# Patient Record
Sex: FEMALE | URBAN - METROPOLITAN AREA
[De-identification: names, ages, dates, MRNs, and addresses within clinical notes are randomized per-mention and may not be internally consistent; named-entity substitution may affect disease eponyms.]

---

## 2019-04-01 ENCOUNTER — APPOINTMENT (OUTPATIENT)
Age: 29
Setting detail: DERMATOLOGY
End: 2019-04-01

## 2019-04-01 DIAGNOSIS — L50.1 IDIOPATHIC URTICARIA: ICD-10-CM

## 2019-04-01 DIAGNOSIS — L29.8 OTHER PRURITUS: ICD-10-CM

## 2019-04-01 PROCEDURE — OTHER COUNSELING: OTHER

## 2019-04-01 PROCEDURE — OTHER PRESCRIPTION MEDICATION MANAGEMENT: OTHER

## 2019-04-01 PROCEDURE — 99202 OFFICE O/P NEW SF 15 MIN: CPT

## 2019-04-01 PROCEDURE — OTHER ADDITIONAL NOTES: OTHER

## 2019-04-01 PROCEDURE — OTHER PRESCRIPTION: OTHER

## 2019-04-01 PROCEDURE — OTHER MIPS QUALITY: OTHER

## 2019-04-01 RX ORDER — HYDROXYZINE HYDROCHLORIDE 25 MG/1
25 MG TABLET, FILM COATED ORAL QHS
Qty: 30 | Refills: 1 | Status: ERX | COMMUNITY
Start: 2019-04-01

## 2019-04-01 RX ORDER — TRIAMCINOLONE ACETONIDE 1 MG/G
0.1% CREAM TOPICAL BID
Qty: 1 | Refills: 0 | Status: ERX | COMMUNITY
Start: 2019-04-01

## 2019-04-01 RX ORDER — FEXOFENADINE 180 MG/1
180 MG TABLET, FILM COATED ORAL QD
Qty: 30 | Refills: 1 | Status: ERX | COMMUNITY
Start: 2019-04-01

## 2019-04-01 ASSESSMENT — LOCATION SIMPLE DESCRIPTION DERM
LOCATION SIMPLE: LEFT FOREARM
LOCATION SIMPLE: RIGHT HAND
LOCATION SIMPLE: RIGHT FOREARM
LOCATION SIMPLE: LEFT HAND

## 2019-04-01 ASSESSMENT — LOCATION DETAILED DESCRIPTION DERM
LOCATION DETAILED: LEFT PROXIMAL DORSAL FOREARM
LOCATION DETAILED: RIGHT RADIAL DORSAL HAND
LOCATION DETAILED: LEFT RADIAL DORSAL HAND
LOCATION DETAILED: RIGHT PROXIMAL DORSAL FOREARM
LOCATION DETAILED: RIGHT ULNAR DORSAL HAND

## 2019-04-01 ASSESSMENT — LOCATION ZONE DERM
LOCATION ZONE: HAND
LOCATION ZONE: ARM

## 2019-04-01 NOTE — PROCEDURE: ADDITIONAL NOTES
Additional Notes: Patient was instructed to stop the buspirone until rash clears.
Detail Level: Simple

## 2019-04-01 NOTE — HPI: RASH
Is This A New Presentation, Or A Follow-Up?: Rash
Additional History: Patient was given Clobetasol which she felt made the rash worse.  Patient went to emergency room approximately two weeks ago and was given a ten day course of oral steroids.

## 2019-04-01 NOTE — PROCEDURE: MIPS QUALITY
Quality 110: Preventive Care And Screening: Influenza Immunization: Influenza Immunization not Administered for Documented Reasons.
Quality 130: Documentation Of Current Medications In The Medical Record: Current Medications Documented
Quality 131: Pain Assessment And Follow-Up: Pain assessment using a standardized tool is documented as negative, no follow-up plan required
Quality 226: Preventive Care And Screening: Tobacco Use: Screening And Cessation Intervention: Patient screened for tobacco use, is a smoker AND received Cessation Counseling
Detail Level: Detailed

## 2019-04-29 ENCOUNTER — APPOINTMENT (OUTPATIENT)
Age: 29
Setting detail: DERMATOLOGY
End: 2019-04-29

## 2019-04-29 ENCOUNTER — RX ONLY (RX ONLY)
Age: 29
End: 2019-04-29

## 2019-04-29 DIAGNOSIS — L27.1 LOCALIZED SKIN ERUPTION DUE TO DRUGS AND MEDICAMENTS TAKEN INTERNALLY: ICD-10-CM

## 2019-04-29 DIAGNOSIS — L29.8 OTHER PRURITUS: ICD-10-CM

## 2019-04-29 PROBLEM — L30.9 DERMATITIS, UNSPECIFIED: Status: ACTIVE | Noted: 2019-04-29

## 2019-04-29 PROCEDURE — OTHER COUNSELING: OTHER

## 2019-04-29 PROCEDURE — OTHER MIPS QUALITY: OTHER

## 2019-04-29 PROCEDURE — 11104 PUNCH BX SKIN SINGLE LESION: CPT

## 2019-04-29 PROCEDURE — 99213 OFFICE O/P EST LOW 20 MIN: CPT | Mod: 25

## 2019-04-29 PROCEDURE — OTHER ADDITIONAL NOTES: OTHER

## 2019-04-29 PROCEDURE — OTHER PRESCRIPTION: OTHER

## 2019-04-29 PROCEDURE — OTHER BIOPSY BY PUNCH METHOD: OTHER

## 2019-04-29 RX ORDER — FEXOFENADINE 180 MG/1
180 MG TABLET, FILM COATED ORAL QD
Qty: 30 | Refills: 1 | Status: ERX

## 2019-04-29 RX ORDER — CLOBETASOL PROPIONATE 0.5 MG/G
1 CREAM TOPICAL BID
Qty: 1 | Refills: 3 | Status: ERX | COMMUNITY
Start: 2019-04-29

## 2019-04-29 RX ORDER — HYDROXYZINE HYDROCHLORIDE 25 MG/1
25 MG TABLET, FILM COATED ORAL QHS
Qty: 30 | Refills: 1 | Status: ERX

## 2019-04-29 ASSESSMENT — LOCATION SIMPLE DESCRIPTION DERM
LOCATION SIMPLE: RIGHT FOREARM
LOCATION SIMPLE: LEFT FOREARM
LOCATION SIMPLE: RIGHT HAND
LOCATION SIMPLE: LEFT HAND

## 2019-04-29 ASSESSMENT — LOCATION ZONE DERM
LOCATION ZONE: ARM
LOCATION ZONE: HAND

## 2019-04-29 ASSESSMENT — LOCATION DETAILED DESCRIPTION DERM
LOCATION DETAILED: RIGHT ULNAR DORSAL HAND
LOCATION DETAILED: RIGHT PROXIMAL DORSAL FOREARM
LOCATION DETAILED: LEFT PROXIMAL DORSAL FOREARM
LOCATION DETAILED: LEFT RADIAL DORSAL HAND

## 2019-04-29 NOTE — PROCEDURE: ADDITIONAL NOTES
Additional Notes: Discussed potential diagnoses, advised we would take punch biopsy sample at today’s visit to clarify. Advised what to expect during and after procedure. Patient voiced understanding and opted for procedure. Consent signed. Advised to discontinue triamcinolone, begin using clobetasol. We will refill hydroxyzine and fexofenadine for patient to take as prescribed.
Detail Level: Simple

## 2019-04-29 NOTE — PROCEDURE: BIOPSY BY PUNCH METHOD
Hemostasis: None
Additional Anesthesia Volume In Cc (Will Not Render If 0): 0
Notification Instructions: Patient will be notified of biopsy results. However, patient instructed to call the office if not contacted within 2 weeks.
Anesthesia Type: 1% lidocaine with 1:100,000 epinephrine
Render Post-Care Instructions In Note?: no
Was A Bandage Applied: Yes
Punch Size In Mm: 3
Post-Care Instructions: I reviewed with the patient in detail post-care instructions. Patient is to keep the biopsy site dry overnight, and then apply bacitracin twice daily until healed. Patient may apply hydrogen peroxide soaks to remove any crusting.
Anesthesia Volume In Cc (Will Not Render If 0): 0.5
Consent: Written consent was obtained and risks were reviewed including but not limited to scarring, infection, bleeding, scabbing, incomplete removal, nerve damage and allergy to anesthesia.
Dressing: bandage
Suture Removal: 14 days
Detail Level: Detailed
Billing Type: Third-Party Bill
Epidermal Sutures: 4-0 Nylon
Home Suture Removal Text: Patient was provided a home suture removal kit and will remove their sutures at home.  If they have any questions or difficulties they will call the office.
Wound Care: Bacitracin
Biopsy Type: H and E

## 2019-04-29 NOTE — PROCEDURE: MIPS QUALITY
Quality 110: Preventive Care And Screening: Influenza Immunization: Influenza Immunization not Administered for Documented Reasons.
Quality 130: Documentation Of Current Medications In The Medical Record: Current Medications Documented
Quality 131: Pain Assessment And Follow-Up: Pain assessment using a standardized tool is documented as negative, no follow-up plan required
Detail Level: Detailed
Quality 226: Preventive Care And Screening: Tobacco Use: Screening And Cessation Intervention: Patient screened for tobacco use, is a smoker AND received Cessation Counseling

## 2019-05-30 ENCOUNTER — APPOINTMENT (OUTPATIENT)
Age: 29
Setting detail: DERMATOLOGY
End: 2019-05-30

## 2019-05-30 DIAGNOSIS — L50.1 IDIOPATHIC URTICARIA: ICD-10-CM

## 2019-05-30 DIAGNOSIS — B35.2 TINEA MANUUM: ICD-10-CM

## 2019-05-30 DIAGNOSIS — L29.8 OTHER PRURITUS: ICD-10-CM

## 2019-05-30 PROCEDURE — 99213 OFFICE O/P EST LOW 20 MIN: CPT

## 2019-05-30 PROCEDURE — OTHER MIPS QUALITY: OTHER

## 2019-05-30 PROCEDURE — OTHER PRESCRIPTION MEDICATION MANAGEMENT: OTHER

## 2019-05-30 PROCEDURE — OTHER COUNSELING: OTHER

## 2019-05-30 PROCEDURE — OTHER PRESCRIPTION: OTHER

## 2019-05-30 RX ORDER — FLUCONAZOLE 150 MG/1
150 MG TABLET ORAL WEEKLY
Qty: 4 | Refills: 0 | Status: ERX | COMMUNITY
Start: 2019-05-30

## 2019-05-30 RX ORDER — KETOCONAZOLE 20 MG/G
2% CREAM TOPICAL BID
Qty: 1 | Refills: 1 | Status: ERX | COMMUNITY
Start: 2019-05-30

## 2019-05-30 ASSESSMENT — LOCATION DETAILED DESCRIPTION DERM
LOCATION DETAILED: LEFT RADIAL DORSAL HAND
LOCATION DETAILED: RIGHT PROXIMAL DORSAL FOREARM
LOCATION DETAILED: RIGHT ULNAR DORSAL HAND
LOCATION DETAILED: LEFT THENAR EMINENCE
LOCATION DETAILED: RIGHT RADIAL PALM
LOCATION DETAILED: RIGHT RADIAL DORSAL HAND
LOCATION DETAILED: LEFT PROXIMAL DORSAL FOREARM

## 2019-05-30 ASSESSMENT — LOCATION SIMPLE DESCRIPTION DERM
LOCATION SIMPLE: LEFT HAND
LOCATION SIMPLE: RIGHT HAND
LOCATION SIMPLE: LEFT FOREARM
LOCATION SIMPLE: RIGHT FOREARM

## 2019-05-30 ASSESSMENT — LOCATION ZONE DERM
LOCATION ZONE: HAND
LOCATION ZONE: ARM

## 2019-05-30 NOTE — PROCEDURE: PRESCRIPTION MEDICATION MANAGEMENT
Render In Strict Bullet Format?: No
Continue Regimen: Fexofenadine 180mg
Modify Regimen: Clobetasol cream should not be used on the affected areas on the hands. Ketoconazole cream should be used instead.
Detail Level: Zone

## 2019-05-30 NOTE — PROCEDURE: MIPS QUALITY
Quality 131: Pain Assessment And Follow-Up: Pain assessment using a standardized tool is documented as negative, no follow-up plan required
Quality 226: Preventive Care And Screening: Tobacco Use: Screening And Cessation Intervention: Patient screened for tobacco use, is a smoker AND received Cessation Counseling
Detail Level: Detailed
Quality 130: Documentation Of Current Medications In The Medical Record: Current Medications Documented
Quality 110: Preventive Care And Screening: Influenza Immunization: Influenza Immunization not Administered for Documented Reasons.